# Patient Record
Sex: FEMALE | Race: BLACK OR AFRICAN AMERICAN
[De-identification: names, ages, dates, MRNs, and addresses within clinical notes are randomized per-mention and may not be internally consistent; named-entity substitution may affect disease eponyms.]

---

## 2019-02-20 ENCOUNTER — HOSPITAL ENCOUNTER (INPATIENT)
Dept: HOSPITAL 62 - ER | Age: 54
LOS: 2 days | Discharge: HOME | DRG: 313 | End: 2019-02-22
Attending: FAMILY MEDICINE | Admitting: FAMILY MEDICINE
Payer: OTHER GOVERNMENT

## 2019-02-20 DIAGNOSIS — Z95.5: ICD-10-CM

## 2019-02-20 DIAGNOSIS — Z90.3: ICD-10-CM

## 2019-02-20 DIAGNOSIS — R51: ICD-10-CM

## 2019-02-20 DIAGNOSIS — R07.89: Primary | ICD-10-CM

## 2019-02-20 DIAGNOSIS — Z23: ICD-10-CM

## 2019-02-20 DIAGNOSIS — F17.200: ICD-10-CM

## 2019-02-20 DIAGNOSIS — R10.9: ICD-10-CM

## 2019-02-20 DIAGNOSIS — I25.2: ICD-10-CM

## 2019-02-20 DIAGNOSIS — K58.9: ICD-10-CM

## 2019-02-20 DIAGNOSIS — I25.10: ICD-10-CM

## 2019-02-20 DIAGNOSIS — E66.01: ICD-10-CM

## 2019-02-20 DIAGNOSIS — Z82.49: ICD-10-CM

## 2019-02-20 DIAGNOSIS — E78.5: ICD-10-CM

## 2019-02-20 DIAGNOSIS — Z71.6: ICD-10-CM

## 2019-02-20 DIAGNOSIS — G89.29: ICD-10-CM

## 2019-02-20 DIAGNOSIS — K21.9: ICD-10-CM

## 2019-02-20 DIAGNOSIS — N17.9: ICD-10-CM

## 2019-02-20 DIAGNOSIS — Z79.82: ICD-10-CM

## 2019-02-20 DIAGNOSIS — I10: ICD-10-CM

## 2019-02-20 LAB
ADD MANUAL DIFF: NO
ALBUMIN SERPL-MCNC: 4 G/DL (ref 3.5–5)
ALP SERPL-CCNC: 142 U/L (ref 38–126)
ALT SERPL-CCNC: 32 U/L (ref 9–52)
ANION GAP SERPL CALC-SCNC: 10 MMOL/L (ref 5–19)
AST SERPL-CCNC: 33 U/L (ref 14–36)
BASOPHILS # BLD AUTO: 0 10^3/UL (ref 0–0.2)
BASOPHILS NFR BLD AUTO: 0.4 % (ref 0–2)
BILIRUB DIRECT SERPL-MCNC: 0.3 MG/DL (ref 0–0.4)
BILIRUB SERPL-MCNC: 0.3 MG/DL (ref 0.2–1.3)
BUN SERPL-MCNC: 13 MG/DL (ref 7–20)
CALCIUM: 9.2 MG/DL (ref 8.4–10.2)
CHLORIDE SERPL-SCNC: 103 MMOL/L (ref 98–107)
CK MB SERPL-MCNC: 0.29 NG/ML (ref ?–4.55)
CK MB SERPL-MCNC: 0.36 NG/ML (ref ?–4.55)
CK SERPL-CCNC: 97 U/L (ref 30–135)
CO2 SERPL-SCNC: 28 MMOL/L (ref 22–30)
EOSINOPHIL # BLD AUTO: 0.2 10^3/UL (ref 0–0.6)
EOSINOPHIL NFR BLD AUTO: 2.5 % (ref 0–6)
ERYTHROCYTE [DISTWIDTH] IN BLOOD BY AUTOMATED COUNT: 14.2 % (ref 11.5–14)
GLUCOSE SERPL-MCNC: 90 MG/DL (ref 75–110)
HCT VFR BLD CALC: 39.8 % (ref 36–47)
HGB BLD-MCNC: 13.6 G/DL (ref 12–15.5)
LYMPHOCYTES # BLD AUTO: 3.5 10^3/UL (ref 0.5–4.7)
LYMPHOCYTES NFR BLD AUTO: 48.7 % (ref 13–45)
MCH RBC QN AUTO: 31 PG (ref 27–33.4)
MCHC RBC AUTO-ENTMCNC: 34.3 G/DL (ref 32–36)
MCV RBC AUTO: 91 FL (ref 80–97)
MONOCYTES # BLD AUTO: 0.5 10^3/UL (ref 0.1–1.4)
MONOCYTES NFR BLD AUTO: 7.2 % (ref 3–13)
NEUTROPHILS # BLD AUTO: 3 10^3/UL (ref 1.7–8.2)
NEUTS SEG NFR BLD AUTO: 41.2 % (ref 42–78)
PLATELET # BLD: 329 10^3/UL (ref 150–450)
POTASSIUM SERPL-SCNC: 4.3 MMOL/L (ref 3.6–5)
PROT SERPL-MCNC: 7.2 G/DL (ref 6.3–8.2)
RBC # BLD AUTO: 4.39 10^6/UL (ref 3.72–5.28)
SODIUM SERPL-SCNC: 141.1 MMOL/L (ref 137–145)
TOTAL CELLS COUNTED % (AUTO): 100 %
TROPONIN I SERPL-MCNC: < 0.012 NG/ML
TROPONIN I SERPL-MCNC: < 0.012 NG/ML
WBC # BLD AUTO: 7.3 10^3/UL (ref 4–10.5)

## 2019-02-20 PROCEDURE — 93005 ELECTROCARDIOGRAM TRACING: CPT

## 2019-02-20 PROCEDURE — 84484 ASSAY OF TROPONIN QUANT: CPT

## 2019-02-20 PROCEDURE — 82550 ASSAY OF CK (CPK): CPT

## 2019-02-20 PROCEDURE — 96374 THER/PROPH/DIAG INJ IV PUSH: CPT

## 2019-02-20 PROCEDURE — 71046 X-RAY EXAM CHEST 2 VIEWS: CPT

## 2019-02-20 PROCEDURE — 84481 FREE ASSAY (FT-3): CPT

## 2019-02-20 PROCEDURE — 80053 COMPREHEN METABOLIC PANEL: CPT

## 2019-02-20 PROCEDURE — G0008 ADMIN INFLUENZA VIRUS VAC: HCPCS

## 2019-02-20 PROCEDURE — 36415 COLL VENOUS BLD VENIPUNCTURE: CPT

## 2019-02-20 PROCEDURE — 93010 ELECTROCARDIOGRAM REPORT: CPT

## 2019-02-20 PROCEDURE — 96375 TX/PRO/DX INJ NEW DRUG ADDON: CPT

## 2019-02-20 PROCEDURE — 70450 CT HEAD/BRAIN W/O DYE: CPT

## 2019-02-20 PROCEDURE — 84439 ASSAY OF FREE THYROXINE: CPT

## 2019-02-20 PROCEDURE — 90686 IIV4 VACC NO PRSV 0.5 ML IM: CPT

## 2019-02-20 PROCEDURE — 85027 COMPLETE CBC AUTOMATED: CPT

## 2019-02-20 PROCEDURE — 80307 DRUG TEST PRSMV CHEM ANLYZR: CPT

## 2019-02-20 PROCEDURE — 80061 LIPID PANEL: CPT

## 2019-02-20 PROCEDURE — 99285 EMERGENCY DEPT VISIT HI MDM: CPT

## 2019-02-20 PROCEDURE — 74220 X-RAY XM ESOPHAGUS 1CNTRST: CPT

## 2019-02-20 PROCEDURE — 90471 IMMUNIZATION ADMIN: CPT

## 2019-02-20 PROCEDURE — 80048 BASIC METABOLIC PNL TOTAL CA: CPT

## 2019-02-20 PROCEDURE — 84443 ASSAY THYROID STIM HORMONE: CPT

## 2019-02-20 PROCEDURE — 85025 COMPLETE CBC W/AUTO DIFF WBC: CPT

## 2019-02-20 PROCEDURE — 82553 CREATINE MB FRACTION: CPT

## 2019-02-20 PROCEDURE — 81001 URINALYSIS AUTO W/SCOPE: CPT

## 2019-02-20 PROCEDURE — 83735 ASSAY OF MAGNESIUM: CPT

## 2019-02-20 RX ADMIN — FAMOTIDINE SCH MG: 20 TABLET, FILM COATED ORAL at 22:20

## 2019-02-20 RX ADMIN — Medication SCH: at 22:20

## 2019-02-20 RX ADMIN — MORPHINE SULFATE PRN MG: 10 INJECTION INTRAMUSCULAR; INTRAVENOUS; SUBCUTANEOUS at 22:19

## 2019-02-20 NOTE — ER DOCUMENT REPORT
ED General





- General


Chief Complaint: Chest Pain


Stated Complaint: CHEST PAIN


Time Seen by Provider: 02/20/19 13:43


Primary Care Provider: 


[place] [Primary Care Provider] - Follow up as needed


Mode of Arrival: Ambulatory


Notes: 





53-year-old female with coronary artery disease, AMI status post stent in 2016, 

hypertension, hyperlipidemia, IBS presents with complaint of chest pain and 

headache. Patient denies shortness of breath.  Patient denies vomiting or 

abdominal pain.  Patient denies any numbness or paresthesias in any of her 

extremities.  Patient denies any unilateral weakness.  She states she has felt 

disoriented over the last couple of days.  Patient reports that she has had chr

onic chest pain for years but over the last 3 days it has changed and is now 

described as a pulling instead of a dull ache.  Patient also describes a 

headache with associated nausea and lightheadedness. 


TRAVEL OUTSIDE OF THE [place] IN LAST 30 DAYS: No





- Related Data


Allergies/Adverse Reactions: 


                                        





No Known Allergies Allergy (Verified 02/20/19 12:18)


   











Past Medical History





- General


Information source: Patient, [facility] Records





- Social History


Smoking Status: Current Every Day Smoker


Frequency of alcohol use: Occasional


Drug Abuse: None


Family History: Reviewed & Not Pertinent


Patient has suicidal ideation: No


Patient has homicidal ideation: No





- Past Medical History


Cardiac Medical History: Reports: Hx Heart Attack - 1/2016, Hx 

Hypercholesterolemia, Hx Hypertension


Neurological Medical History: Reports: Hx Migraine


Renal/ Medical History: Denies: Hx Peritoneal Dialysis


Past Surgical History: Reports: Hx Abdominal Surgery - "gorwth" removed, Hx 

Cardiac Surgery - stent 1/2016





Review of Systems





- Review of Systems


Constitutional: See HPI


EENT: No symptoms reported


Cardiovascular: See HPI


Respiratory: See HPI


Gastrointestinal: See HPI


Genitourinary: See HPI


Female Genitourinary: No symptoms reported


Musculoskeletal: No symptoms reported


Skin: No symptoms reported


Hematologic/Lymphatic: No symptoms reported


Neurological/Psychological: See HPI





Physical Exam





- Vital signs


Vitals: 


                                        











Temp Pulse Resp BP Pulse Ox


 


 98.8 F   73   16   113/67   98 


 


 02/20/19 12:34  02/20/19 12:34  02/20/19 12:34  02/20/19 12:34  02/20/19 12:34














- Notes


Notes: 





PHYSICAL EXAMINATION:





Reviewed vital signs and charting by RN





GENERAL: Alert, interacts well. No acute distress.


HEAD: Normocephalic, atraumatic.


EYES: Pupils equal, round, and reactive to light. Extraocular movements intact.


ENT: Oral mucosa moist, tongue midline. 


NECK: Full range of motion. Supple. Trachea midline.


LUNGS: Clear to auscultation bilaterally, no wheezes, rales, or rhonchi. No 

respiratory distress.


HEART: Regular rate and rhythm. No murmur


ABDOMEN: soft, non-tender. Non-distended. Bowel sounds present in all 4 

quadrants. no McBurney's point tenderness, no Rodriguez sign.


EXTREMITIES: Moves all 4 extremities spontaneously. No edema,  No cyanosis.


NEUROLOGICAL: Alert and oriented. Normal speech. 


PSYCH: Normal affect, normal mood.


SKIN: Warm, dry, normal turgor. No rashes or lesions noted.





Course





- Re-evaluation


Re-evalutation: 





02/20/19 18:55


Overall well-appearing 53-year-old female presents with chest pain.  Cardiac 

workup was conducted and was negative.  EKG was sinus rhythm with no STEMI or ST

depressions of abnormalities.  Negative troponin x2.  No abnormal lab work.  

Heart score 4 for moderate suspicion, age, risk factors.  I will call the 

hospitalist on-call, Dr. Weiland, to formally assess the patient for 

observation.


02/20/19 19:17


Talk to Dr. Weiland he accepted the patient for telemetry observation with plan 

for stress test hopefully in the morning.





- Vital Signs


Vital signs: 


                                        











Temp Pulse Resp BP Pulse Ox


 


 98.8 F   73   18   103/63   95 


 


 02/20/19 12:34  02/20/19 12:34  02/20/19 16:01  02/20/19 16:01  02/20/19 16:01














- Laboratory


Result Diagrams: 


                                 02/20/19 15:00





                                 02/20/19 15:00


Laboratory results interpreted by me: 


                                        











  02/20/19 02/20/19





  15:00 15:00


 


RDW  14.2 H 


 


Seg Neutrophils %  41.2 L 


 


Lymphocytes %  48.7 H 


 


Alkaline Phosphatase   142 H














Discharge





- Discharge


Clinical Impression: 


Chest pain


Qualifiers:


 Chest pain type: unspecified Qualified Code(s): R07.9 - Chest pain, unspecified





Headache


Qualifiers:


 Headache type: unspecified Headache chronicity pattern: acute headache 





Condition: Good


Disposition: ADMITTED AS OBSERVATION


Admitting Provider: Hospitalist


Unit Admitted: Telemetry


Referrals: 


[facility] [Primary Care Provider] - Follow up as needed

## 2019-02-20 NOTE — EKG REPORT
SEVERITY:- BORDERLINE ECG -

SINUS RHYTHM

LEFT AXIS DEVIATION

BORDERLINE T ABNORMALITIES, ANTERIOR LEADS

:

Confirmed by: Davian Grant MD 20-Feb-2019 13:04:53

## 2019-02-20 NOTE — ER DOCUMENT REPORT
ED Medical Screen (RME)





- General


Chief Complaint: Chest Pain


Stated Complaint: CHEST PAIN


Time Seen by Provider: 02/20/19 13:43


Mode of Arrival: Ambulatory


Information source: Patient, [facility] Records


Notes: 





53-year-old female with coronary artery disease, hypertension, hyperlipidemia, 

IBS presents with complaint of chest pain and headache.  Patient reports that 

she has had chronic chest pain for years but over the last 3 days it has changed

and is now described as a pulling instead of a dull ache.  Patient also 

describes a headache with associated nausea and lightheadedness.








I have greeted and performed a rapid initial assessment of this patient.  A 

comprehensive ED assessment and evaluation of the patient, analysis of test 

results and completion of medical decision making process we will be contacted 

by additional ED providers.











PHYSICAL EXAMINATION:





Vital signs reviewed-within normal limits





GENERAL: Well-appearing, well-nourished and in no acute distress.





LUNGS: No respiratory distress





Musculoskeletal: Normal range of motion





NEUROLOGICAL:  Normal speech, normal gait. 





PSYCH: Normal mood, normal affect.





SKIN: Warm, Dry, normal turgor, no rashes or lesions noted.


TRAVEL OUTSIDE OF THE [place] IN LAST 30 DAYS: No





- HPI


Onset: Last week


Onset/Duration: Gradual, Persistent, Worse


Quality of pain: Achy, Throbbing, Other - Pulling


Severity: Moderate


Pain Level: 2


Associated Symptoms: Chest pain, Headache


Exacerbated by: Denies


Relieved by: Denies


Similar symptoms previously: Yes


Recently seen / treated by doctor: Yes





- Related Data


Smoking: Non-smoker


Frequency of alcohol use: None


Drug Abuse: None


Allergies/Adverse Reactions: 


                                        





No Known Allergies Allergy (Verified 02/20/19 12:18)


   











Past Medical History





- Social History


Frequency of alcohol use: Occasional


Drug Abuse: None





- Past Medical History


Cardiac Medical History: Reports: Hx Heart Attack - 1/2016, Hx 

Hypercholesterolemia, Hx Hypertension


Neurological Medical History: Reports: Hx Migraine


Renal/ Medical History: Denies: Hx Peritoneal Dialysis


Past Surgical History: Reports: Hx Abdominal Surgery - "gorwth" removed, Hx 

Cardiac Surgery - stent 1/2016





Physical Exam





- Vital signs


Vitals: 





                                        











Temp Pulse Resp BP Pulse Ox


 


 98.8 F   73   16   113/67   98 


 


 02/20/19 12:34  02/20/19 12:34  02/20/19 12:34  02/20/19 12:34  02/20/19 12:34














Course





- Vital Signs


Vital signs: 





                                        











Temp Pulse Resp BP Pulse Ox


 


 98.8 F   73   16   113/67   98 


 


 02/20/19 12:34  02/20/19 12:34  02/20/19 12:34  02/20/19 12:34  02/20/19 12:34

## 2019-02-20 NOTE — RADIOLOGY REPORT (SQ)
EXAM DESCRIPTION:  CHEST 2 VIEWS



COMPLETED DATE/TIME:  2/20/2019 2:32 pm



REASON FOR STUDY:  Chest pain



COMPARISON:  None.



EXAM PARAMETERS:  NUMBER OF VIEWS: two views

TECHNIQUE: Digital Frontal and Lateral radiographic views of the chest acquired.

RADIATION DOSE: NA

LIMITATIONS: none



FINDINGS:  LUNGS AND PLEURA:  Near subsegmental atelectasis or scar in the left mid-lower lung zones.
  The right lung is clear.  No pneumothorax or pleural effusion.

MEDIASTINUM AND HILAR STRUCTURES: No masses or contour abnormalities.

HEART AND VASCULAR STRUCTURES: Heart normal size.  No evidence for failure.

BONES: No acute findings.

HARDWARE: None in the chest.

OTHER: No other significant finding.



IMPRESSION:  1.  Linear subsegmental atelectasis or scar in the left mid-lower lung zones.  No acute 
pulmonary consolidation.



TECHNICAL DOCUMENTATION:  JOB ID:  [id number]

 2011 [employer]- All Rights Reserved



Reading location - IP/workstation name: [email or web address]

## 2019-02-20 NOTE — RADIOLOGY REPORT (SQ)
EXAM DESCRIPTION:  CT HEAD WITHOUT



COMPLETED DATE/TIME:  2/20/2019 2:22 pm



REASON FOR STUDY:  Worsening headache



COMPARISON:  None.



TECHNIQUE:  Axial images acquired through the brain without intravenous contrast.  Images reviewed wi
th bone, brain and subdural windows.  Additional sagittal and coronal reconstructions were generated.
 Images stored on PACS.

All CT scanners at this facility use dose modulation, iterative reconstruction, and/or weight based d
osing when appropriate to reduce radiation dose to as low as reasonably achievable (ALARA).

[facility]: Dose Right  [facility]: CareDose    [facility]: Dose Right    [facility]: Teradose 4D    [facility]: Smart Technologies



RADIATION DOSE:  CT Rad equipment meets quality standard of care and radiation dose reduction techniq
ues were employed. CTDIvol: 53.2 mGy. DLP: 1017 mGy-cm. mGy.



LIMITATIONS:  None.



FINDINGS:  VENTRICLES: Normal size and contour.

CEREBRUM: No masses.  No hemorrhage.  No midline shift.  No evidence for acute infarction. Normal gra
y/white matter differentiation. No areas of low density in the white matter.

CEREBELLUM: No masses.  No hemorrhage.  No alteration of density.  No evidence for acute infarction.

EXTRAAXIAL SPACES: Large falcine calcification.  No fluid collections.  No masses.

ORBITS AND GLOBE: No intra- or extraconal masses.  Normal contour of globe without masses.

CALVARIUM: No fracture.

PARANASAL SINUSES: No fluid or mucosal thickening.

SOFT TISSUES: No mass or hematoma.

OTHER: No other significant finding.



IMPRESSION:  No acute abnormality in brain.

EVIDENCE OF ACUTE STROKE: NO.



COMMENT:  Quality ID # [id number]: Final reports with documentation of one or more dose reduction techniques
 (e.g., Automated exposure control, adjustment of the mA and/or kV according to patient size, use of 
iterative reconstruction technique)



TECHNICAL DOCUMENTATION:  JOB ID:  [id number]

 2011 [employer]- All Rights Reserved



Reading location - IP/workstation name: [email or web address]

## 2019-02-21 LAB
ANION GAP SERPL CALC-SCNC: 8 MMOL/L (ref 5–19)
BUN SERPL-MCNC: 18 MG/DL (ref 7–20)
CALCIUM: 9 MG/DL (ref 8.4–10.2)
CHLORIDE SERPL-SCNC: 105 MMOL/L (ref 98–107)
CHOLEST SERPL-MCNC: 105.25 MG/DL (ref 0–200)
CK MB SERPL-MCNC: 0.26 NG/ML (ref ?–4.55)
CK MB SERPL-MCNC: 0.28 NG/ML (ref ?–4.55)
CK MB SERPL-MCNC: 0.29 NG/ML (ref ?–4.55)
CK MB SERPL-MCNC: 0.3 NG/ML (ref ?–4.55)
CO2 SERPL-SCNC: 27 MMOL/L (ref 22–30)
ERYTHROCYTE [DISTWIDTH] IN BLOOD BY AUTOMATED COUNT: 14.6 % (ref 11.5–14)
FREE T4 (FREE THYROXINE): 1.3 NG/DL (ref 0.78–2.19)
GLUCOSE SERPL-MCNC: 113 MG/DL (ref 75–110)
HCT VFR BLD CALC: 36.1 % (ref 36–47)
HGB BLD-MCNC: 12.4 G/DL (ref 12–15.5)
LDLC SERPL DIRECT ASSAY-MCNC: 72 MG/DL (ref ?–100)
MCH RBC QN AUTO: 31 PG (ref 27–33.4)
MCHC RBC AUTO-ENTMCNC: 34.3 G/DL (ref 32–36)
MCV RBC AUTO: 90 FL (ref 80–97)
PLATELET # BLD: 286 10^3/UL (ref 150–450)
POTASSIUM SERPL-SCNC: 3.8 MMOL/L (ref 3.6–5)
RBC # BLD AUTO: 4 10^6/UL (ref 3.72–5.28)
SODIUM SERPL-SCNC: 139.9 MMOL/L (ref 137–145)
T3FREE SERPL-MCNC: 3.64 PG/ML (ref 2.77–5.27)
TRIGL SERPL-MCNC: 86 MG/DL (ref ?–150)
TROPONIN I SERPL-MCNC: < 0.012 NG/ML
TSH SERPL-ACNC: 4.2 UIU/ML (ref 0.47–4.68)
VLDLC SERPL CALC-MCNC: 17 MG/DL (ref 10–31)
WBC # BLD AUTO: 7.1 10^3/UL (ref 4–10.5)

## 2019-02-21 RX ADMIN — MORPHINE SULFATE PRN MG: 10 INJECTION INTRAMUSCULAR; INTRAVENOUS; SUBCUTANEOUS at 23:52

## 2019-02-21 RX ADMIN — MORPHINE SULFATE PRN MG: 10 INJECTION INTRAMUSCULAR; INTRAVENOUS; SUBCUTANEOUS at 18:27

## 2019-02-21 RX ADMIN — Medication SCH: at 06:30

## 2019-02-21 RX ADMIN — FAMOTIDINE SCH: 20 TABLET, FILM COATED ORAL at 21:49

## 2019-02-21 RX ADMIN — DEXAMETHASONE SODIUM PHOSPHATE PRN MG: 10 INJECTION INTRAMUSCULAR; INTRAVENOUS at 23:52

## 2019-02-21 RX ADMIN — DEXAMETHASONE SODIUM PHOSPHATE PRN MG: 10 INJECTION INTRAMUSCULAR; INTRAVENOUS at 18:28

## 2019-02-21 RX ADMIN — DOCUSATE SODIUM SCH: 100 CAPSULE, LIQUID FILLED ORAL at 17:23

## 2019-02-21 RX ADMIN — ASPIRIN SCH MG: 81 TABLET, COATED ORAL at 09:45

## 2019-02-21 RX ADMIN — MORPHINE SULFATE PRN MG: 10 INJECTION INTRAMUSCULAR; INTRAVENOUS; SUBCUTANEOUS at 09:44

## 2019-02-21 RX ADMIN — FAMOTIDINE SCH MG: 20 TABLET, FILM COATED ORAL at 09:44

## 2019-02-21 RX ADMIN — FONDAPARINUX SODIUM SCH MG: 2.5 INJECTION, SOLUTION SUBCUTANEOUS at 14:48

## 2019-02-21 RX ADMIN — Medication SCH: at 14:48

## 2019-02-21 RX ADMIN — MORPHINE SULFATE PRN MG: 10 INJECTION INTRAMUSCULAR; INTRAVENOUS; SUBCUTANEOUS at 14:48

## 2019-02-21 RX ADMIN — MORPHINE SULFATE PRN MG: 10 INJECTION INTRAMUSCULAR; INTRAVENOUS; SUBCUTANEOUS at 03:45

## 2019-02-21 RX ADMIN — DOCUSATE SODIUM SCH MG: 100 CAPSULE, LIQUID FILLED ORAL at 09:45

## 2019-02-21 RX ADMIN — SERTRALINE HYDROCHLORIDE SCH MG: 50 TABLET ORAL at 09:45

## 2019-02-21 RX ADMIN — Medication SCH ML: at 21:44

## 2019-02-21 RX ADMIN — VITAMIN D, TAB 1000IU (100/BT) SCH UNIT: 25 TAB at 09:48

## 2019-02-21 NOTE — PDOC CONSULTATION
Consultation-Blank


Consultation: 





CARDIOLOGY CONSULTATION by Dr. Joellen Reddy on 2/21/2019.  Patient seen at 1 

PM on 2/21/2019.





REASON FOR CONSULTATION: Patient with history of coronary artery disease, 

history of prior myocardial infarction, and history of stent placement, with 

chest pain.





HISTORY of PRESENT ILLNESS.  Patient is a 53-year-old Afro-American female with 

known history of hypertension, hyperlipidemia, coronary artery disease and 

history of stent placement who states that about 3 days prior to admission had 

sudden onset of a pulling sensation of chest pain in the center of the chest.  

Without radiation.  This was constant, and was unrelieved with any maneuvers.  

She states she did not take nitroglycerin.  She states that this sensation of 

pulling in the chest is different from her GERD symptoms.  She denies any 

increase or the symptoms being precipitated by exertion.  There is no associated

nausea vomiting, or diaphoresis, or palpitations or shortness of breath.  So far

her troponin I have been negative.  The patient denies any PND orthopnea.  There

is no near syncope or syncope.





PAST MEDICAL HISTORY: As per her records from [facility] she had a non-ST 

elevation MI in January 2016.  She had a mid right coronary artery lesion which 

was stented with a drug-eluting stent then.  Subsequently she has been having 

chronic chest pains which are very atypical.  She had an admission last year in 

1 of the hospitals there, and MI was ruled out.  She also has chronic abdominal 

pain.  She states that she had a gastrectomy due to a growth about many years 

ago.  She denies history of diabetes mellitus.  She has a history of 

hypertension.  She also has hyperlipidemia.  There is no history of asthma or 

COPD.  There is no history of sleep apnea.  There is no history of chronic 

kidney disease.  There is no history of TIA CVA symptoms.  The patient denies 

any clinical history of depression or anxiety, but the patient does appear to be

depressed.  On review of her records from [facility], the patient has a history 

of noncardiac chest pain chronically, and also chronic abdominal pain.  She also

has a history of GERD.





PAST SURGICAL HISTORY: Is gastrectomy, cardiac catheterization and stent 

placement in the right coronary artery in 2016 JANUARY AFTER AN MI.  





Social HISTORY: Patient does smoke.  There is no history of EtOH abuse.





FAMILY HISTORY: Is positive for coronary artery disease and hypertension.





ALLERGIES: The patient has no known allergies.





DISPOSITION: The patient's is a full code.  She states her sister and her mother

are her surrogate healthcare decision makers.





REVIEW OF SYSTEMS: CONSTITUTIONAL: Denies any fever chills or rigors.  Complains

of generalized fatigue and malaise.  HEAD: Complains of headaches.  History of 

headaches off and on.  But no definite diagnosis of migraines.  No history of 

head injury.  EYES: No history of amblyopia diplopia.  No history of amaurosis 

fugax.  EARS: No history of hearing loss.  No tinnitus.  No vertigo.  NOSE: No 

history of hayfever.  No history of nosebleeds.  No history of nasal polyposis. 

MOUTH: No history of altered taste sensation.  No ulcers in the mouth.  No 

bleeding from the gums.  THROAT: No history of odynophagia or dysphagia.  No 

recurrent sore throats.  SKIN: No pruritus.  No yellowish discoloration of the 

skin.  No history of psoriasis.  NECK: No denies neck pain.  No swelling in the 

neck.  LUNGS: No history of asthma or COPD.  No wheezing.  No history of 

pulmonary embolism.  Denies history of sleep apnea.  No history of hemoptysis.  

No history of pleuritic chest pain.  No symptoms of bronchitis.  No symptoms of 

upper or lower respiratory tract infections.  HEART: History of coronary artery 

disease.  History of non-ST elevation MI in January 2016.  At that time the 

patient had a drug-eluting stent in the right coronary artery.  She has a 

history of hypertension.  History of chronic atypical noncardiac chest pains.  

History of hypertension present.  History of hyperlipidemia present.  No history

of congestive heart failure.  No history of palpitations or arrhythmia no 

history of PND orthopnea leg edema.  RENAL: No history of chronic kidney 

disease.  No symptoms of UTI.  No history of hematuria pyuria or dysuria.  

ENDOCRINE: No history of thyroid disease.  No history of diabetes mellitus.  No 

history of hirsutism.  No history of heat or cold intolerance.  No history of 

polydipsia polyuria.  MUSCULOSKELETAL: Denies arthritis or collagen vascular 

disease.  CNS: No history of TIA CVA symptoms.  No history of migraines.  

History of headaches present.  No history of seizures.  PSYCHIATRIC: No history 

of anxiety.  The patient does appear to be depressed although she does not carry

a diagnosis of depression.  No suicidal ideation.  No history of homicidal 

ideation.  VASCULAR: No history of calf or buttock claudication no history of 

DVT.  HEMATOLOGICAL: No history of bleeding diathesis.  No history of clotting 

disorders.  GI: History of GERD present.  History of gastrectomy for a growth in

the past.  No history of GI bleed.  No history of jaundice.  No history of fatty

food intolerance.  No history of altered bowel movements.  History of chronic 

abdominal pain off and on.








PHYSICAL EXAMINATION: The patient is morbidly obese.  At present in no acute 

distress in spite of complaints of chest pain.  She appears to be lying 

comfortably in bed.  She is well-groomed.


                                                                Selected Entries











  02/21/19





  12:41


 


Temperature 97.3 F


 


Temperature Oral





Source 


 


Pulse Rate 82


 


Respiratory 16





Rate 


 


Blood Pressure 115/78


 


Blood Pressure 90





Mean 


 


BP Location Left Arm


 


BP Position Supine


 


O2 Sat by Pulse 98





Oximetry 


 


Oxygen Delivery Room Air





Method 








HEAD: Is atraumatic normocephalic.  EYES: Pupils are equal round regular react 

to light and accommodation, extraocular movements are normal.  There is no 

conjunctival pallor.  There is no scleral icterus.  EARS: Tympanic membranes are

intact.  External auditory canals are clear.  NOSE: There is no inflammation of 

the nasal mucous membranes.  There is no deviated nasal septum.  MOUTH: Mucous 

membranes of mouth are moist.  Tongue is moist.  There is no ulcers.  There is 

no bleeding from the gums.  The patient has a class IV Mallampati score.  

THROAT: There is no redness of the oropharynx.  There is no exudates.  SKIN: 

There is no skin rashes or skin lesions.  There is no petechia or ecchymosis.  

NECK: Is  supple, there is no JVD.  Carotids are equal.  There is no carotid 

bruits.  There is no lymphadenopathy.  There is no goiter.  There is no 

accessory muscle respiration use.  Trachea central.  LUNGS: Clear to 

auscultation percussion without any rhonchi rales or wheezing.  On palpation 

there is no chest wall tenderness.  HEART: S1-S2 is heard.  There is no S3 

gallop.  There is no S4 gallop.  There is systolic murmur left sternal border 

and the apex.  There is no rub.  ABDOMEN: Is obese.  Nontender.  There is no 

hepatosplenic megaly.  Bowel sounds are well heard.  There is no tender areas 

masses.  EXTREMITIES: Femorals are deep.  Femorals are diminished.  Leg pulses 

are well felt.  There is no pedal edema.  There is no DVT or cellulitis.  There 

is no calf tenderness.  There is no petechia or ecchymosis.  There is no DVT or 

cellulitis.  There is no cyanosis or clubbing.  Capillary refill is normal.  

CNS: The patient is conscious awake alert oriented x3 with no focal deficits.  

PSYCHIATRIC although the patient judgment and insight are intact, she is of slow

mentation, and her affect is slightly flat.  She does appear to be slightly 

depressed.





Current Medications











Generic Name Dose Route Start Last Admin





  Trade Name Freq  PRN Reason Stop Dose Admin


 


Acetaminophen  650 mg  02/20/19 19:59  02/21/19 21:42





  Tylenol 325 Mg Tablet  PO  03/22/19 19:58  650 mg





  Q4HP PRN   Administration





  For headache, pain or fever   





     





     





     


 


Al Hydrox/Mg Hydrox/Simethicone  30 ml  02/20/19 19:48  02/21/19 03:45





  Maalox Plus Susp 30 Udcup  PO  03/22/19 19:47  30 ml





  Q6HP PRN   Administration





  HEARTBURN   





     





     





     


 


Albuterol  2.5 mg  02/20/19 19:59  





  Ventolin 0.083% Neb 2.5 Mg/3 Ml Ampul  NEB  03/22/19 19:58  





  RTQ1HP PRN   





  SHORTNESS OF BREATH   





     





     





     


 


Amitriptyline HCl  100 mg  02/21/19 22:00  02/21/19 21:42





  Elavil 50 Mg Tablet  PO  03/23/19 21:59  100 mg





  [facility]HS CLARE   Administration





     





     





     





     


 


Amlodipine Besylate  2.5 mg  02/21/19 20:00  02/21/19 21:41





  Norvasc 2.5 Mg Tablet  PO  03/23/19 19:59  2.5 mg





  DAILY CLARE   Administration





     





     





     





     


 


Aspirin  81 mg  02/21/19 10:00  02/21/19 09:45





  Ecotrin 81 Mg Ec Tablet  PO  03/23/19 09:59  81 mg





  DAILY CLARE   Administration





     





     





     





     


 


Atorvastatin Calcium  80 mg  02/21/19 22:00  02/21/19 21:42





  Lipitor 80 Mg Tablet  PO  03/23/19 21:59  80 mg





  QHS CLARE   Administration





     





     





     





     


 


Cholecalciferol  1,000 unit  02/21/19 10:00  02/21/19 09:48





  Vitamin D3 1000 Unit Tablet  PO  03/23/19 09:59  1,000 unit





  DAILY CLARE   Administration





     





     





     





     


 


Docusate Sodium  100 mg  02/21/19 10:00  02/21/19 17:23





  Colace 100 Mg Capsule  PO  03/23/19 09:59  Not Given





  BID [facility]   





     





     





     





     


 


Famotidine  20 mg  02/20/19 22:00  02/21/19 21:49





  Pepcid 20 Mg Tablet  PO  03/22/19 21:59  Not Given





  Q12 CLARE   





     





     





     





     


 


Fondaparinux  2.5 mg  02/21/19 08:00  02/21/19 14:48





  Arixtra Inj 2.5 Mg/0.5 Ml Disp.Syrin  SUBCUT  03/23/19 07:59  2.5 mg





  QAM CLARE   Administration





     





     





     





     


 


Hydrochlorothiazide  12.5 mg  02/21/19 10:00  02/21/19 09:48





  Hydrodiuril 25 Mg Tablet  PO  03/23/19 09:59  Not Given





  DAILY [facility]   





     





     





     





     


 


Sodium Chloride  1,000 mls @ 50 mls/hr  02/21/19 09:49  





  Nacl 0.9% 1000 Ml Iv Soln  IV  03/23/19 09:48  





  CONTINUOUS PRN   





  THIS MED IS NOT "PRN"   





     





     





     


 


Magnesium Hydroxide  30 ml  02/20/19 19:48  





  Milk Of Magnesia 30 Ml Udcup  PO  03/22/19 19:47  





  HSP PRN   





  FOR CONSTIPATION   





     





     





     


 


Morphine Sulfate  2 mg  02/20/19 19:59  02/21/19 18:27





  Morphine 10 Mg/Ml Inj  IV  02/27/19 19:58  2 mg





  Q2HP PRN   Administration





  FOR PAIN SCALE 1-2   





     





     





     


 


Morphine Sulfate  3 mg  02/20/19 19:59  





  Morphine 10 Mg/Ml Inj  IV  02/27/19 19:58  





  Q2HP PRN   





  FOR PAIN SCALE 3-4   





     





     





     


 


Morphine Sulfate  4 mg  02/20/19 19:59  





  Morphine 10 Mg/Ml Inj  IV  02/27/19 19:58  





  Q2HP PRN   





  PAIN SCALE OF 5   





     





     





     


 


Nicotine  1 each  02/20/19 19:59  





  Nicoderm 21 Mg/24 Hr Transderm Patch  TD  03/22/19 19:58  





  DAILYP PRN   





  WITHDRAWAL SYMPTOMS   





     





     





     


 


Nitroglycerin  1 tab  02/20/19 19:59  





  Nitrostat 0.4 Mg (1/150 Gr) Tabs 25/Bottle  SL  03/22/19 19:58  





  Q5MP PRN   





  FOR CHEST PAIN   





     





     





     


 


Ondansetron HCl  4 mg  02/20/19 19:48  02/21/19 18:28





  Zofran Inj/Pf 4 Mg/2 Ml Sdv  IV  03/22/19 19:47  4 mg





  Q4HP PRN   Administration





  FOR NAUSEA/VOMITING   





     





     





     


 


Ondansetron HCl  4 mg  02/20/19 19:48  





  Zofran Odt 4 Mg Tablet  PO  03/22/19 19:47  





  Q4HP PRN   





  FOR NAUSEA/VOMITING   





     





     





     


 


Sertraline HCl  75 mg  02/21/19 10:00  02/21/19 09:45





  Zoloft 50 Mg Tablet  PO  03/23/19 09:59  75 mg





  DAILY CLARE   Administration





     





     





     





     


 


Sodium Chloride  2.5 ml  02/20/19 22:00  02/21/19 21:44





  Saline Flush 2.5 Ml Monoject Prefil Syrin  IV  03/22/19 21:59  2.5 ml





  Q8 CLARE   Administration





     





     





     





     














Discontinued Medications














Generic Name Dose Route Start Last Admin





  Trade Name Freq  PRN Reason Stop Dose Admin


 


Acetaminophen  975 mg  02/20/19 17:53  02/20/19 17:59





  Tylenol 325 Mg Tablet  PO  02/20/19 17:54  975 mg





  NOW ONE   Administration





     





     





     





     


 


Aspirin  324 mg  02/20/19 13:43  02/20/19 14:12





  Aspirin 81 Mg Chewable Tablet  PO  02/20/19 13:44  324 mg





  NOW ONE   Administration





     





     





     





     


 


Diphenhydramine HCl  12.5 mg  02/20/19 13:44  02/20/19 15:08





  Benadryl Inj 50 Mg/1 Ml Vial  IV  02/20/19 13:45  12.5 mg





  NOW ONE   Administration





     





     





     





     


 


Hydralazine HCl  20 mg  02/20/19 19:59  





  Apresoline Inj/Pf 20 Mg/1 Ml Sdv  IV  03/22/19 19:58  





  Q4HP PRN   





  Give For Sbp > 160 / Dbp > 100   





     





     





     


 


Ibuprofen  400 mg  02/20/19 18:49  02/20/19 19:05





  Motrin 600 Mg Tablet  PO  02/20/19 18:50  400 mg





  NOW ONE   Administration





     





     





     





     


 


Lisinopril  40 mg  02/21/19 10:00  02/21/19 09:48





  Prinivil 10 Mg Tablet  PO  03/23/19 09:59  Not Given





  DAILY CLARE   





     





     





     





     


 


Metoclopramide HCl  10 mg  02/20/19 13:44  02/20/19 15:08





  Reglan Inj/Pf 10 Mg/2 Ml Sdv  IV  02/20/19 13:45  10 mg





  NOW ONE   Administration





     





     





     





     


 


Metoprolol Tartrate  100 mg  02/21/19 10:00  02/21/19 09:48





  Lopressor 100 Mg Tablet  PO  03/23/19 09:59  Not Given





  Q12 CLARE   





     





     





     





     


 


Nitroglycerin   tab  02/21/19 09:47  





  Nitrostat 0.4 Mg (1/150 Gr) Tabs 25/Bottle  SL  03/23/19 09:46  





  Q5MP PRN   





  FOR CHEST PAIN   





     





     





     








HOME MEDICATIONS:








                              Labs- All tests 24 hr











  02/21/19 02/21/19 02/21/19





  03:03 03:03 03:03


 


WBC    7.1


 


RBC    4.00


 


Hgb    12.4


 


Hct    36.1


 


MCV    90


 


MCH    31.0


 


MCHC    34.3


 


RDW    14.6 H


 


Plt Count    286


 


Sodium   


 


Potassium   


 


Chloride   


 


Carbon Dioxide   


 


Anion Gap   


 


BUN   


 


Creatinine   


 


Est GFR ( Amer)   


 


Est GFR (Non-Af Amer)   


 


Glucose   


 


Calcium   


 


Magnesium   


 


Creatine Kinase  78  


 


CK-MB (CK-2)   0.29 


 


Troponin I   < 0.012 


 


Triglycerides   


 


Cholesterol   


 


LDL Cholesterol Direct   


 


VLDL Cholesterol   


 


HDL Cholesterol   


 


TSH   


 


Free T4   


 


Free T3 pg/mL   














  02/21/19 02/21/19 02/21/19





  03:03 03:03 09:23


 


WBC   


 


RBC   


 


Hgb   


 


Hct   


 


MCV   


 


MCH   


 


MCHC   


 


RDW   


 


Plt Count   


 


Sodium  139.9  


 


Potassium  3.8  


 


Chloride  105  


 


Carbon Dioxide  27  


 


Anion Gap  8  


 


BUN  18  


 


Creatinine  1.57 H  


 


Est GFR ( Amer)  42 L  


 


Est GFR (Non-Af Amer)  34 L  


 


Glucose  113 H  


 


Calcium  9.0  


 


Magnesium  2.5 H  


 


Creatine Kinase    77


 


CK-MB (CK-2)   


 


Troponin I   


 


Triglycerides  86  


 


Cholesterol  105.25  


 


LDL Cholesterol Direct  72  


 


VLDL Cholesterol  17.0  


 


HDL Cholesterol  29 L  


 


TSH   4.20 


 


Free T4   1.30 


 


Free T3 pg/mL   3.64 














  02/21/19 02/21/19 02/21/19





  09:23 10:03 15:24


 


WBC   


 


RBC   


 


Hgb   


 


Hct   


 


MCV   


 


MCH   


 


MCHC   


 


RDW   


 


Plt Count   


 


Sodium   


 


Potassium   


 


Chloride   


 


Carbon Dioxide   


 


Anion Gap   


 


BUN   


 


Creatinine   


 


Est GFR ( Amer)   


 


Est GFR (Non-Af Amer)   


 


Glucose   


 


Calcium   


 


Magnesium   


 


Creatine Kinase    72


 


CK-MB (CK-2)  0.26  0.30 


 


Troponin I  < 0.012  < 0.012 


 


Triglycerides   


 


Cholesterol   


 


LDL Cholesterol Direct   


 


VLDL Cholesterol   


 


HDL Cholesterol   


 


TSH   


 


Free T4   


 


Free T3 pg/mL   














  02/21/19





  15:24


 


WBC 


 


RBC 


 


Hgb 


 


Hct 


 


MCV 


 


MCH 


 


MCHC 


 


RDW 


 


Plt Count 


 


Sodium 


 


Potassium 


 


Chloride 


 


Carbon Dioxide 


 


Anion Gap 


 


BUN 


 


Creatinine 


 


Est GFR ( Amer) 


 


Est GFR (Non-Af Amer) 


 


Glucose 


 


Calcium 


 


Magnesium 


 


Creatine Kinase 


 


CK-MB (CK-2)  0.28


 


Troponin I  < 0.012


 


Triglycerides 


 


Cholesterol 


 


LDL Cholesterol Direct 


 


VLDL Cholesterol 


 


HDL Cholesterol 


 


TSH 


 


Free T4 


 


Free T3 pg/mL 











                                        





Chest X-Ray  02/20/19 13:43


IMPRESSION:  1.  Linear subsegmental atelectasis or scar in the left mid-lower 

lung zones.  No acute pulmonary consolidation.


 








Head CT  02/20/19 13:46


IMPRESSION:  No acute abnormality in brain.


EVIDENCE OF ACUTE STROKE: NO.


 








Esophagus X-Ray  02/21/19 00:00


IMPRESSION:  Small hiatal hernia.  Minimal gastroesophageal reflux.  Tertiary 

contractions of the esophagus.


 








Acetaminophen [Tylenol Extra Strength 500 mg Tablet] 1,000 mg PO Q6HP PRN 

02/20/19 


Amitriptyline HCl [Elavil 50 mg Tablet] 100 mg PO QHS 02/20/19 


Aspirin [Ecotrin 81 mg EC Tablet] 81 mg PO DAILY 02/20/19 


Atorvastatin Calcium [Lipitor 80 mg Tablet] 80 mg PO QHS 02/20/19 


Cholecalciferol (Vitamin D3) [Vitamin D3 1000 Unit Tablet] 1,000 unit PO DAILY 

02/20/19 


Hydrochlorothiazide [Hydrodiuril 25 mg Tablet] 12.5 mg PO DAILY 02/20/19 


Lisinopril [Prinivil 40 mg Tablet] 40 mg PO DAILY 02/20/19 


Metoprolol Tartrate [Lopressor 100 mg Tablet] 100 mg PO Q12 02/20/19 


Nitroglycerin [Nitrostat 0.4 mg (1/150 Gr) Tabs 25/Bottle] 0.4 mg SL Q5MP PRN 

02/20/19 


Ranitidine HCl [Zantac 150 mg Tablet] 150 mg PO BID 02/20/19 


Sertraline HCl [Zoloft 50 mg Tablet] 75 mg PO DAILY 02/20/19 





EKG: Sinus rhythm nonspecific T changes inferior leads.





IMPRESSION/RECOMMENDATION:





1 chest pain most likely noncardiac.  This may be related to tertiary 

contractions of the esophagus.  MI has been ruled out.  Would recommend starting

the patient on amlodipine initially at 2.5 mg x1 dose, and subsequently 

increased to 5 mg p.o. twice daily.  Continue beta-blocker for now.  Continue 

aspirin.


2.  Coronary artery disease.  History of non-ST elevation MI.  History of drug-

eluting stent to the right coronary artery in 2016 January.  Will add 

amlodipine.  And once this is been started, the patient is chest pain-free, then

would recommend discharge the patient and schedule the patient for outpatient IV

Lexiscan Cardiolite, and an echocardiogram.


3.  Abdominal pain:?  Etiology


4.  Hypertension: Blood pressure seems to be well-controlled continue current 

blood pressure medications.


5.  Hyperlipidemia: Continue statin


6.  Tobacco abuse disorder: Tobacco cessation counseling given.  Patient spent 3

minutes on this.  Ill effects of tobacco have been discussed with the patient.


7.  History of gastrectomy: Patient states it secondary to a growth.  Details 

not available.  Patient may benefit from outpatient gastroenterology 

consultation.


8.  GERD: Continue proton pump inhibitors note patient also has a small hiatal 

hernia.





Medications reviewed.  Medications adjusted.  New medications added.  X-ray 

findings EKG and lab findings were discussed with the patient.  Management plan 

was discussed with attending physician on the case.  Medical decision making is 

of high complexity.  60 minutes spent on this patient more than 50% of time 

spent in direct patient care.  We will follow with you.  The patient has no 

cardiologist here, and she desires to follow-up with me.

## 2019-02-21 NOTE — RADIOLOGY REPORT (SQ)
EXAM DESCRIPTION:  BARIUM SWALLOW ESOPHAGUS



COMPLETED DATE/TIME:  2/21/2019 1:27 pm



REASON FOR STUDY:  CHEST PAIN / GERD



COMPARISON:  None.



TECHNIQUE:  Under fluoroscopic guidance, patient ingested effervescent granules followed by thick and
 thin barium. Fluoroscopic spot images and routine radiographic images acquired and stored on PACS.

12 MM BARIUM TABLET GIVEN: Yes.

No significant delay in passage.



LIMITATIONS:  None.



FLUOROSCOPY TIME:  FLUORO TIME: 1 minutes 43 seconds

14 series of digital images saved to PACS.



FINDINGS:  NEUROMUSCULAR COORDINATION OF SWALLOW: Normal. No aspiration.

ESOPHAGEAL MOTILITY: Normal peristalsis. No esophageal spasm. Occasional tertiary contractions of the
 esophagus.

ESOPHAGEAL MUCOSA: Normal mucosa without masses or ulceration.

GASTRO-ESOPHAGEAL JUNCTION: Small hiatal hernia with minimal reflux.

NON-GI TRACT STRUCTURES: No significant finding.

OTHER: No other significant finding.



IMPRESSION:  Small hiatal hernia.  Minimal gastroesophageal reflux.  Tertiary contractions of the eso
phagus.



COMMENT:  Quality :  Final reports for procedures using fluoroscopy that document radiation exp
osure indices, or exposure time and number of fluorographic images (if radiation exposure indices are
 not available)



TECHNICAL DOCUMENTATION:  JOB ID:  [id number]

 2011 [employer]- All Rights Reserved



Reading location - IP/workstation name: [email or web address]

## 2019-02-21 NOTE — PDOC PROGRESS REPORT
Subjective


Progress Note for:: 02/21/19


Subjective:: 





53 year old female who presented to the emergency room with a 3-day history of a

change in her chronic chest pain.  Patient states that 3 days prior to her 

admission she developed a sudden onset of a "chest pain just like I had when I 

had a heart attack 3 years ago".  The pain is described as a constant, 

continuous, unwavering, moderately severe deep seated, pulling or tearing 

sensation, occurring in the left upper chest without radiation.  She also notes 

that for the last 2 days the chest pain has been accompanied by a right 

frontotemporal headache which she describes as a constant, moderately severe, 

sharp pain also without radiation.  She also notes that she had a similar 

headache only one time before and that was when she had her heart attack.  She 

she admits that her headache and chest pain were accompanied by nausea, mild 

lightheadedness and mild confusion.  She has been unable to identify any 

aggravating or ameliorating factors for her chest pain or headache.  In the 

emergency room she was found to have negative cardiac enzymes and an EKG that 

showed no acute changes suggestive of myocardial ischemia or infarction.  

Because of her cardiac history she was admitted for further evaluation and 

treatment to include a cardiology consultation with Dr. Quintero.








2/[id number]-mmnn-old female with coronary artery disease status post stent 

placement in [facility] in 2016 came in with chest pains.  Still complaining of 

chest pains EKG was sinus rhythm and troponins are negative so far.  Cardiology 

consult was requested.  No acute events in the last 24 hours.  Patient is 

afebrile.  Blood pressure is 99/52 be going to hold her morning medications this

morning.  And to start on IV fluids normal saline at 50 cc/h.


Reason For Visit: 


CHEST PAIN








Physical Exam


Vital Signs: 


                                        











Temp Pulse Resp BP Pulse Ox


 


 97.4 F   71   17   101/52 L  96 


 


 02/21/19 03:00  02/21/19 07:00  02/21/19 03:00  02/21/19 03:00  02/21/19 03:00








                                 Intake & Output











 02/20/19 02/21/19 02/22/19





 06:59 06:59 06:59


 


Weight  94.2 kg 











General appearance: PRESENT: no acute distress


Head exam: PRESENT: atraumatic


Eye exam: PRESENT: PERRLA


Mouth exam: PRESENT: moist, tongue midline


Neck exam: ABSENT: carotid bruit, JVD, lymphadenopathy, thyromegaly


Respiratory exam: PRESENT: clear to auscultation jorge luis.  ABSENT: rales, rhonchi, 

wheezes


Cardiovascular exam: PRESENT: RRR.  ABSENT: diastolic murmur, rubs, systolic 

murmur


GI/Abdominal exam: PRESENT: normal bowel sounds, soft.  ABSENT: distended, 

guarding, mass, organolmegaly, rebound, tenderness


Neurological exam: PRESENT: alert, awake, oriented to person, oriented to place,

oriented to time, oriented to situation, CN II-XII grossly intact.  ABSENT: 

motor sensory deficit


Psychiatric exam: PRESENT: appropriate affect, normal mood.  ABSENT: homicidal 

ideation, suicidal ideation





Results


Laboratory Results: 


                                        





                                 02/21/19 03:03 





                                 02/21/19 03:03 





                                        











  02/20/19 02/20/19 02/21/19





  15:00 15:00 03:03


 


WBC  7.3   7.1


 


RBC  4.39   4.00


 


Hgb  13.6   12.4


 


Hct  39.8   36.1


 


MCV  91   90


 


MCH  31.0   31.0


 


MCHC  34.3   34.3


 


RDW  14.2 H   14.6 H


 


Plt Count  329   286


 


Seg Neutrophils %  41.2 L  


 


Lymphocytes %  48.7 H  


 


Monocytes %  7.2  


 


Eosinophils %  2.5  


 


Basophils %  0.4  


 


Absolute Neutrophils  3.0  


 


Absolute Lymphocytes  3.5  


 


Absolute Monocytes  0.5  


 


Absolute Eosinophils  0.2  


 


Absolute Basophils  0.0  


 


Sodium   141.1 


 


Potassium   4.3 


 


Chloride   103 


 


Carbon Dioxide   28 


 


Anion Gap   10 


 


BUN   13 


 


Creatinine   0.97 


 


Est GFR ( Amer)   > 60 


 


Est GFR (Non-Af Amer)   > 60 


 


Glucose   90 


 


Calcium   9.2 


 


Magnesium   


 


Total Bilirubin   0.3 


 


AST   33 


 


ALT   32 


 


Alkaline Phosphatase   142 H 


 


Total Protein   7.2 


 


Albumin   4.0 


 


Triglycerides   


 


Cholesterol   


 


LDL Cholesterol Direct   


 


VLDL Cholesterol   


 


HDL Cholesterol   


 


TSH   


 


Free T4   


 


Free T3 pg/mL   














  02/21/19 02/21/19





  03:03 03:03


 


WBC  


 


RBC  


 


Hgb  


 


Hct  


 


MCV  


 


MCH  


 


MCHC  


 


RDW  


 


Plt Count  


 


Seg Neutrophils %  


 


Lymphocytes %  


 


Monocytes %  


 


Eosinophils %  


 


Basophils %  


 


Absolute Neutrophils  


 


Absolute Lymphocytes  


 


Absolute Monocytes  


 


Absolute Eosinophils  


 


Absolute Basophils  


 


Sodium  139.9 


 


Potassium  3.8 


 


Chloride  105 


 


Carbon Dioxide  27 


 


Anion Gap  8 


 


BUN  18 


 


Creatinine  1.57 H 


 


Est GFR ( Amer)  42 L 


 


Est GFR (Non-Af Amer)  34 L 


 


Glucose  113 H 


 


Calcium  9.0 


 


Magnesium  2.5 H 


 


Total Bilirubin  


 


AST  


 


ALT  


 


Alkaline Phosphatase  


 


Total Protein  


 


Albumin  


 


Triglycerides  86 


 


Cholesterol  105.25 


 


LDL Cholesterol Direct  72 


 


VLDL Cholesterol  17.0 


 


HDL Cholesterol  29 L 


 


TSH   4.20


 


Free T4   1.30


 


Free T3 pg/mL   3.64








                                        











  02/20/19 02/20/19 02/20/19





  15:00 15:00 18:03


 


Creatine Kinase  97  


 


CK-MB (CK-2)   0.36 


 


Troponin I   < 0.012  < 0.012














  02/20/19 02/20/19 02/21/19





  21:00 21:00 03:03


 


Creatine Kinase  82   78


 


CK-MB (CK-2)   0.29 


 


Troponin I   < 0.012 














  02/21/19





  03:03


 


Creatine Kinase 


 


CK-MB (CK-2)  0.29


 


Troponin I  < 0.012











Impressions: 


                                        





Chest X-Ray  02/20/19 13:43


IMPRESSION:  1.  Linear subsegmental atelectasis or scar in the left mid-lower 

lung zones.  No acute pulmonary consolidation.


 








Head CT  02/20/19 13:46


IMPRESSION:  No acute abnormality in brain.


EVIDENCE OF ACUTE STROKE: NO.


 














Assessment & Plan





- Diagnosis


(1) CAD (coronary artery disease), native coronary artery


Qualifiers: 


   Native vs. transplanted heart: native heart   Associated angina: angina 

presence unspecified   Qualified Code(s): I25.10 - Atherosclerotic heart disease

of native coronary artery without angina pectoris   


Is this a current diagnosis for this admission?: Yes   


Plan: 


Patient will be evaluated utilizing serial cardiac enzymes and EKG evaluations 

as well as obtaining a cardiac consultation with Dr. Quintero.  She will be 

continued on her current cardiac medications and her acute chest pain will be 

treated with morphine 2-4 mg IV every 2 hours as needed for chest pain.








2/21/2019-patient came in with chest pain she had a history of coronary artery 

disease status post stent placement in 2016.  Cardiac enzymes are negative EKG 

was negative cardiology consultation was done with Dr. Quintero.  Recent is on

IV morphine every 2 hours as needed for chest pains.  Plan is to continue the 

present management.








(2) HTN (hypertension)


Qualifiers: 


   Hypertension type: essential hypertension   Qualified Code(s): I10 - 

Essential (primary) hypertension   


Is this a current diagnosis for this admission?: Yes   


Plan: 


Patient blood pressure just now is 99/52.  We are going to check a manual blood 

pressure.  Plan to hold her blood pressure medications.  Creatinine is also 

jumped from 0.97-1.57 started on IV fluids normal saline 50 cc/h.








(3) HLD (hyperlipidemia)


Qualifiers: 


   Hyperlipidemia type: unspecified   Qualified Code(s): E78.5 - Hyperlipidemia,

unspecified   


Is this a current diagnosis for this admission?: Yes   


Plan: 


2/21/2019-patient has history of hyperlipidemia, presently on atorvastatin 80 mg

p.o. nightly.  Patient's total cholesterol is 105 and LDL is 72.








(4) Obesity (BMI 30-39.9)


Is this a current diagnosis for this admission?: Yes   


Plan: 


2/21/2019-diet exercise weight loss and lifestyle modifications are discussed 

with the patient.








(5) Acute kidney failure


Is this a current diagnosis for this admission?: Yes   


Plan: 


2/21/2019-patient's admission creatinine is 0.97 and today it is 1.57 acute 

kidney injury most likely prerenal.  Started on normal saline at 50 cc/h plan to

recheck her labs tomorrow.








- Time


Time Spent with patient: 15-24 minutes


Smoking Cessation Education: over 10 minutes


Medications reviewed and adjusted accordingly: Yes


Anticipated discharge: Home

## 2019-02-21 NOTE — EKG REPORT
SEVERITY:- ABNORMAL ECG -

SINUS RHYTHM

LAD, CONSIDER LEFT ANTERIOR FASCICULAR BLOCK

:

Confirmed by: Davian Grant MD 21-Feb-2019 07:41:15

## 2019-02-21 NOTE — PDOC H&P
History of Present Illness


Admission Date/PCP: 


  





  [facility]





Patient complains of: Chest pain


History of Present Illness: 


ALONDRA LAMB is a 53 year old female who presented to the emergency room with a 3-

day history of a change in her chronic chest pain.  Patient states that 3 days 

prior to her admission she developed a sudden onset of a "chest pain just like I

had when I had a heart attack 3 years ago".  The pain is described as a 

constant, continuous, unwavering, moderately severe deep seated, pulling or 

tearing sensation, occurring in the left upper chest without radiation.  She 

also notes that for the last 2 days the chest pain has been accompanied by a rig

ht frontotemporal headache which she describes as a constant, moderately severe,

sharp pain also without radiation.  She also notes that she had a similar 

headache only one time before and that was when she had her heart attack.  She 

she admits that her headache and chest pain were accompanied by nausea, mild 

lightheadedness and mild confusion.  She has been unable to identify any 

aggravating or ameliorating factors for her chest pain or headache.  In the 

emergency room she was found to have negative cardiac enzymes and an EKG that 

showed no acute changes suggestive of myocardial ischemia or infarction.  

Because of her cardiac history she was admitted for further evaluation and 

treatment to include a cardiology consultation with Dr. Quintero.





Past Medical History


Cardiac Medical History: Reports: Coronary Artery Disease, Myocardial Infarction

- 1/2016, Hyperlipidema, Hypertension, Other - Chronic chest pain


   Denies: Atrial Fibrillation, Congestive Heart Failure, DVT, Pulmonary 

Embolism


Cardiac History Note: 


Chronic chest pain: A constant, continuous, unchanging, generalized, mildly to 

moderately intense, dull aching pain of the central and left anterior chest 

without radiation.


Pulmonary Medical History: 


   Denies: Asthma, Chronic Obstructive Pulmonary Disease (COPD)


EENT Medical History: Reports: None


Neurological Medical History: 


   Denies: Hemorrhagic CVA, Ischemic CVA, Migraine, Seizures


Endocrine Medical History: Reports: Obesity


   Denies: Diabetes Mellitus Type 1, Diabetes Mellitus Type 2, Hyperthyroidism, 

Hypothyroidism


Renal/ Medical History: 


   Denies: Chronic Kidney Disease, Nephrolithiasis


Malignancy Medical History: Reports: None


GI Medical History: Reports: Other - Irritable bowel syndrome


   Denies: Cirrhosis, Hepatitis


Musculoskeltal Medical History: 


   Denies: Arthritis, Gout


Skin Medical History: 


   Denies: Eczema, Psoriasis


Psychiatric Medical History: Reports: Tobacco Dependency


   Denies: Alcohol Dependency, Substance Abuse


Traumatic Medical History: Reports: None


Hematology: 


   Denies: Anemia, Bleeding Tendencies


Infectious Medical History: Reports: None





Past Surgical History


Past Surgical History: Reports: Cardiac Catheterization - 1/2016, Coronary Stent

- 1/2016





Social History


Information Source: Patient


Lives with: Alone


Smoking Status: Current Every Day Smoker


Frequency of Alcohol Use: Rare


Hx Recreational Drug Use: No


Drugs: None


Hx Prescription Drug Abuse: No





- Advance Directive


Resuscitation Status: Full Code


Surrogate healthcare decision maker:: 


Mother





Family History


Family History: CAD, Hypertension


Parental Family History Reviewed: Yes


Children Family History Reviewed: No


Sibling(s) Family History Reviewed.: Yes





Medication/Allergy


Home Medications: 








Acetaminophen [Tylenol Extra Strength 500 mg Tablet] 1,000 mg PO Q6HP PRN 

02/20/19 


Amitriptyline HCl [Elavil 50 mg Tablet] 100 mg PO QHS 02/20/19 


Aspirin [Ecotrin 81 mg EC Tablet] 81 mg PO DAILY 02/20/19 


Atorvastatin Calcium [Lipitor 80 mg Tablet] 80 mg PO QHS 02/20/19 


Cholecalciferol (Vitamin D3) [Vitamin D3 1000 Unit Tablet] 1,000 unit PO DAILY 

02/20/19 


Hydrochlorothiazide [Hydrodiuril 25 mg Tablet] 12.5 mg PO DAILY 02/20/19 


Lisinopril [Prinivil 40 mg Tablet] 40 mg PO DAILY 02/20/19 


Metoprolol Tartrate [Lopressor 100 mg Tablet] 100 mg PO Q12 02/20/19 


Nitroglycerin [Nitrostat 0.4 mg (1/150 Gr) Tabs 25/Bottle] 0.4 mg SL Q5MP PRN 

02/20/19 


Ranitidine HCl [Zantac 150 mg Tablet] 150 mg PO BID 02/20/19 


Sertraline HCl [Zoloft 50 mg Tablet] 75 mg PO DAILY 02/20/19 








Allergies/Adverse Reactions: 


                                        





No Known Allergies Allergy (Verified 02/20/19 12:18)


   











Review of Systems


Constitutional: PRESENT: as per HPI, headache(s).  ABSENT: chills, fever(s), 

weakness


Eyes: ABSENT: visual disturbances, other - Eye pain


Ears: ABSENT: hearing changes, other - Ear pain


Nose, Mouth, and Throat: ABSENT: mouth pain, sore throat


Cardiovascular: PRESENT: as per HPI, chest pain.  ABSENT: dyspnea on exertion, 

edema, orthropnea, palpitations


Respiratory: ABSENT: cough, dyspnea


Gastrointestinal: PRESENT: as per HPI, nausea.  ABSENT: abdominal pain, 

constipation, diarrhea, vomiting


Genitourinary: ABSENT: dysuria, hematuria


Musculoskeletal: ABSENT: deformity, joint swelling


Integumentary: ABSENT: pruritus, rash


Neurological: PRESENT: as per HPI, confusion - Mild disorientation, other - 

Lightheadedness.  ABSENT: convulsions, memory loss


Psychiatric: ABSENT: anxiety, depression


Endocrine: ABSENT: cold intolerance, heat intolerance


Hematologic/Lymphatic: ABSENT: easy bleeding, easy bruising





Physical Exam


Vital Signs: 


                                        











Temp Pulse Resp BP Pulse Ox


 


 98.8 F   73   18   103/63   95 


 


 02/20/19 12:34  02/20/19 12:34  02/20/19 16:01  02/20/19 16:01  02/20/19 16:01








                                 Intake & Output











 02/18/19 02/19/19 02/20/19





 23:59 23:59 23:59


 


Weight   92.7 kg











General appearance: PRESENT: no acute distress, cooperative, morbidly obese


Head exam: PRESENT: atraumatic, normocephalic


Eye exam: PRESENT: conjunctiva pink, EOMI.  ABSENT: scleral icterus


Ear exam: PRESENT: normal external ear exam.  ABSENT: bleeding, drainage


Mouth exam: PRESENT: dry mucosa, neck supple


Neck exam: ABSENT: thyromegaly, tracheal deviation


Respiratory exam: PRESENT: clear to auscultation jorge luis, symmetrical, unlabored


Cardiovascular exam: PRESENT: RRR.  ABSENT: clicks, gallop, rubs


Pulses: PRESENT: normal radial pulses, normal dorsalis pedis pul


Vascular exam: PRESENT: normal capillary refill.  ABSENT: pallor


GI/Abdominal exam: PRESENT: normal bowel sounds, soft


Rectal exam: PRESENT: deferred


Extremities exam: ABSENT: joint swelling, pedal edema


Musculoskeletal exam: ABSENT: deformity, dislocation


Neurological exam: PRESENT: alert, oriented to person, oriented to place, 

oriented to time, oriented to situation, CN II-XII grossly intact.  ABSENT: 

motor sensory deficit


Psychiatric exam: PRESENT: appropriate affect, normal mood


Skin exam: PRESENT: dry, intact, warm.  ABSENT: jaundice, rash, urticaria





Results


Laboratory Results: 


                                        





                                 02/20/19 15:00 





                                 02/20/19 15:00 





                                        











  02/20/19 02/20/19





  15:00 15:00


 


WBC  7.3 


 


RBC  4.39 


 


Hgb  13.6 


 


Hct  39.8 


 


MCV  91 


 


MCH  31.0 


 


MCHC  34.3 


 


RDW  14.2 H 


 


Plt Count  329 


 


Seg Neutrophils %  41.2 L 


 


Lymphocytes %  48.7 H 


 


Monocytes %  7.2 


 


Eosinophils %  2.5 


 


Basophils %  0.4 


 


Absolute Neutrophils  3.0 


 


Absolute Lymphocytes  3.5 


 


Absolute Monocytes  0.5 


 


Absolute Eosinophils  0.2 


 


Absolute Basophils  0.0 


 


Sodium   141.1


 


Potassium   4.3


 


Chloride   103


 


Carbon Dioxide   28


 


Anion Gap   10


 


BUN   13


 


Creatinine   0.97


 


Est GFR ( Amer)   > 60


 


Est GFR (Non-Af Amer)   > 60


 


Glucose   90


 


Calcium   9.2


 


Total Bilirubin   0.3


 


AST   33


 


ALT   32


 


Alkaline Phosphatase   142 H


 


Total Protein   7.2


 


Albumin   4.0








                                        











  02/20/19 02/20/19 02/20/19





  15:00 15:00 18:03


 


Creatine Kinase  97  


 


CK-MB (CK-2)   0.36 


 


Troponin I   < 0.012  < 0.012











Impressions: 


                                        





Chest X-Ray  02/20/19 13:43


IMPRESSION:  1.  Linear subsegmental atelectasis or scar in the left mid-lower 

lung zones.  No acute pulmonary consolidation.


 








Head CT  02/20/19 13:46


IMPRESSION:  No acute abnormality in brain.


EVIDENCE OF ACUTE STROKE: NO.


 














Assessment & Plan





- Diagnosis


(1) CAD (coronary artery disease), native coronary artery


Qualifiers: 


   Native vs. transplanted heart: native heart   Associated angina: angina 

presence unspecified   Qualified Code(s): I25.10 - Atherosclerotic heart disease

of native coronary artery without angina pectoris   


Is this a current diagnosis for this admission?: Yes   


Plan: 


Patient will be evaluated utilizing serial cardiac enzymes and EKG evaluations 

as well as obtaining a cardiac consultation with Dr. Quintero.  She will be 

continued on her current cardiac medications and her acute chest pain will be 

treated with morphine 2-4 mg IV every 2 hours as needed for chest pain.








(2) HTN (hypertension)


Qualifiers: 


   Hypertension type: essential hypertension   Qualified Code(s): I10 - 

Essential (primary) hypertension   


Is this a current diagnosis for this admission?: Yes   


Plan: 


Patient will be continued on her current medications for hypertension and her 

vital signs will be monitored closely.








(3) HLD (hyperlipidemia)


Qualifiers: 


   Hyperlipidemia type: unspecified   Qualified Code(s): E78.5 - Hyperlipidemia,

unspecified   


Is this a current diagnosis for this admission?: Yes   


Plan: 


Patient will be continued on her current statin therapy and a lipid profile will

be obtained to evaluate the efficacy of therapy.








(4) Chronic chest pain


Is this a current diagnosis for this admission?: Yes   


Plan: 


Patient's chronic chest pain will be treated on an ongoing basis as recommended 

by Dr. Quintero.








(5) Obesity (BMI 30-39.9)


Is this a current diagnosis for this admission?: Yes   


Plan: 


Patient will be evaluated by the dietitian and dietary recommendations for 

lifestyle changes to enhance her health and life going forward will be made.








(6) Tobacco use disorder, severe, dependence


Is this a current diagnosis for this admission?: Yes   


Plan: 


Advised patient smoking should be discontinued and counseling for smoking 

cessation was provided briefly.  A nicotine patch will be available for her use.








- Time


Time Spent: 30 to 50 Minutes


Critical Time spent with patient: Less than 15 minutes


Smoking Cessation Education: 3 to 10 minutes


Medications reviewed and adjusted accordingly: Yes


Anticipated discharge: Home


Within: within 48 hours





- Inpatient Certification


Based on my medical assessment, after consideration of the patient's 

comorbidities, presenting symptoms, or acuity I expect that the services needed 

warrant INPATIENT care.: No


I certify that my determination is in accordance with my understanding of 

Medicare's requirements for reasonable and necessary INPATIENT services [42 CFR 

412.3e].: No


Medical Necessity: Significant Comorbidiites Make Outpatient Treatment Too 

Risky, Need Close Monitoring Due to Risk of Patient Decompensation, Need For 

Continuous Telemetry Monitoring, Risk of Complication if Not Cared For in 

Hospital

## 2019-02-22 VITALS — DIASTOLIC BLOOD PRESSURE: 52 MMHG | SYSTOLIC BLOOD PRESSURE: 101 MMHG

## 2019-02-22 LAB
ADD MANUAL DIFF: NO
ALBUMIN SERPL-MCNC: 3.4 G/DL (ref 3.5–5)
ALP SERPL-CCNC: 114 U/L (ref 38–126)
ALT SERPL-CCNC: 26 U/L (ref 9–52)
ANION GAP SERPL CALC-SCNC: 8 MMOL/L (ref 5–19)
APPEARANCE UR: (no result)
APTT PPP: YELLOW S
AST SERPL-CCNC: 21 U/L (ref 14–36)
BARBITURATES UR QL SCN: NEGATIVE
BASOPHILS # BLD AUTO: 0 10^3/UL (ref 0–0.2)
BASOPHILS NFR BLD AUTO: 0.7 % (ref 0–2)
BILIRUB DIRECT SERPL-MCNC: 0.2 MG/DL (ref 0–0.4)
BILIRUB SERPL-MCNC: 0.4 MG/DL (ref 0.2–1.3)
BILIRUB UR QL STRIP: NEGATIVE
BUN SERPL-MCNC: 15 MG/DL (ref 7–20)
CALCIUM: 8.6 MG/DL (ref 8.4–10.2)
CHLORIDE SERPL-SCNC: 104 MMOL/L (ref 98–107)
CK MB SERPL-MCNC: 0.3 NG/ML (ref ?–4.55)
CO2 SERPL-SCNC: 26 MMOL/L (ref 22–30)
EOSINOPHIL # BLD AUTO: 0.1 10^3/UL (ref 0–0.6)
EOSINOPHIL NFR BLD AUTO: 1.5 % (ref 0–6)
ERYTHROCYTE [DISTWIDTH] IN BLOOD BY AUTOMATED COUNT: 14.2 % (ref 11.5–14)
GLUCOSE SERPL-MCNC: 107 MG/DL (ref 75–110)
GLUCOSE UR STRIP-MCNC: NEGATIVE MG/DL
HCT VFR BLD CALC: 36 % (ref 36–47)
HGB BLD-MCNC: 12.2 G/DL (ref 12–15.5)
KETONES UR STRIP-MCNC: NEGATIVE MG/DL
LYMPHOCYTES # BLD AUTO: 3 10^3/UL (ref 0.5–4.7)
LYMPHOCYTES NFR BLD AUTO: 39.7 % (ref 13–45)
MCH RBC QN AUTO: 30.4 PG (ref 27–33.4)
MCHC RBC AUTO-ENTMCNC: 34 G/DL (ref 32–36)
MCV RBC AUTO: 89 FL (ref 80–97)
METHADONE UR QL SCN: NEGATIVE
MONOCYTES # BLD AUTO: 0.5 10^3/UL (ref 0.1–1.4)
MONOCYTES NFR BLD AUTO: 7.1 % (ref 3–13)
NEUTROPHILS # BLD AUTO: 3.8 10^3/UL (ref 1.7–8.2)
NEUTS SEG NFR BLD AUTO: 51 % (ref 42–78)
NITRITE UR QL STRIP: NEGATIVE
PCP UR QL SCN: NEGATIVE
PH UR STRIP: 5 [PH] (ref 5–9)
PLATELET # BLD: 269 10^3/UL (ref 150–450)
POTASSIUM SERPL-SCNC: 3.8 MMOL/L (ref 3.6–5)
PROT SERPL-MCNC: 6.2 G/DL (ref 6.3–8.2)
PROT UR STRIP-MCNC: NEGATIVE MG/DL
RBC # BLD AUTO: 4.03 10^6/UL (ref 3.72–5.28)
SODIUM SERPL-SCNC: 138.3 MMOL/L (ref 137–145)
SP GR UR STRIP: 1.02
TOTAL CELLS COUNTED % (AUTO): 100 %
TROPONIN I SERPL-MCNC: < 0.012 NG/ML
URINE AMPHETAMINES SCREEN: NEGATIVE
URINE BENZODIAZEPINES SCREEN: NEGATIVE
URINE COCAINE SCREEN: NEGATIVE
URINE MARIJUANA (THC) SCREEN: NEGATIVE
UROBILINOGEN UR-MCNC: NEGATIVE MG/DL (ref ?–2)
WBC # BLD AUTO: 7.5 10^3/UL (ref 4–10.5)

## 2019-02-22 RX ADMIN — DEXAMETHASONE SODIUM PHOSPHATE PRN MG: 10 INJECTION INTRAMUSCULAR; INTRAVENOUS at 06:11

## 2019-02-22 RX ADMIN — VITAMIN D, TAB 1000IU (100/BT) SCH UNIT: 25 TAB at 10:16

## 2019-02-22 RX ADMIN — ASPIRIN SCH MG: 81 TABLET, COATED ORAL at 10:18

## 2019-02-22 RX ADMIN — DOCUSATE SODIUM SCH: 100 CAPSULE, LIQUID FILLED ORAL at 10:18

## 2019-02-22 RX ADMIN — FAMOTIDINE SCH MG: 20 TABLET, FILM COATED ORAL at 10:17

## 2019-02-22 RX ADMIN — FONDAPARINUX SODIUM SCH MG: 2.5 INJECTION, SOLUTION SUBCUTANEOUS at 10:18

## 2019-02-22 RX ADMIN — SERTRALINE HYDROCHLORIDE SCH MG: 50 TABLET ORAL at 10:16

## 2019-02-22 RX ADMIN — MORPHINE SULFATE PRN MG: 10 INJECTION INTRAMUSCULAR; INTRAVENOUS; SUBCUTANEOUS at 06:11

## 2019-02-22 RX ADMIN — Medication SCH ML: at 06:11

## 2019-02-22 NOTE — PDOC DISCHARGE SUMMARY
General





- Admit/Disc Date/PCP


Admission Date/Primary Care Provider: 


  02/21/19 09:50





  [facility]





Discharge Date: 02/22/19





- Discharge Diagnosis


(1) CAD (coronary artery disease), native coronary artery


Is this a current diagnosis for this admission?: Yes   


Summary: 


Patient will be evaluated utilizing serial cardiac enzymes and EKG evaluations 

as well as obtaining a cardiac consultation with Dr. Quintero.  She will be 

continued on her current cardiac medications and her acute chest pain will be 

treated with morphine 2-4 mg IV every 2 hours as needed for chest pain.








2/21/2019-patient came in with chest pain she had a history of coronary artery 

disease status post stent placement in 2016.  Cardiac enzymes are negative EKG 

was negative cardiology consultation was done with Dr. Quintero.  Recent is on

IV morphine every 2 hours as needed for chest pains.  Plan is to continue the 

present management.








2/22/2019-patient has history of coronary artery disease status post stent 

placement in 2016.  During this hospital stay cardiac enzymes, EKGs are 

negative.  Cardiology consult with Dr. Quintero was done he is impression is 

pain is noncardiac in origin.  May be most likely musculoskeletal.  He planning 

to see her in the office next week and arrange for outpatient stress test.  He 

recommended to start the patient on amlodipine 2.5 mg p.o. twice daily, and 

continue metoprolol 100 mg p.o. twice daily.  Plan to continue her aspirin and 

cholesterol also.  The patient is pain-free plan is to arrange for the 

outpatient stress test.








(2) HTN (hypertension)


Is this a current diagnosis for this admission?: Yes   


Summary: 


Patient blood pressure just now is 99/52.  We are going to check a manual blood 

pressure.  Plan to hold her blood pressure medications.  Creatinine is also 

jumped from 0.97-1.57 started on IV fluids normal saline 50 cc/h.





2/22/2019-patient blood pressure today he is 115/78.  And creatinine was 

improved 2.96.  Hypotension is resolved.  Patient is advised to continue 

amlodipine 2.5 mg p.o. twice daily and metoprolol 100 mg p.o. every 12 hours at 

home.








(3) HLD (hyperlipidemia)


Is this a current diagnosis for this admission?: Yes   


Summary: 


2/21/2019-patient has history of hyperlipidemia, presently on atorvastatin 80 mg

p.o. nightly.  Patient's total cholesterol is 105 and LDL is 72.





2/22/2019 patient history of hyperlipidemia on atorvastatin 80 mg p.o. nightly 

plan is advised her to continue the medication at home.








(4) Obesity (BMI 30-39.9)


Is this a current diagnosis for this admission?: Yes   


Summary: 


2/22/2019-patient's BMI is more than 35 diet exercise weight loss and lifestyle 

modifications are discussed with the patient.  Dietary consult was done during 

the hospital stay.








(5) Acute kidney failure


Is this a current diagnosis for this admission?: Yes   


Summary: 


2/21/2019-patient's admission creatinine is 0.97 and today it is 1.57 acute 

kidney injury most likely prerenal.  Started on normal saline at 50 cc/h plan to

recheck her labs tomorrow.


2/22/2019-patient creatinine is 1.57 yesterday started on IV fluids at 50 cc/h 

creatinine is back to 2.96 acute kidney injury most likely prerenal is resolved.








- Additional Information


Resuscitation Status: Full Code


Discharge Diet: Cardiac


Discharge Activity: Activity As Tolerated


Prescriptions: 


Amlodipine Besylate [Norvasc 2.5 mg Tablet] 2.5 mg PO Q12 #60 tablet


Nitroglycerin [Nitrostat 0.4 mg (1/150 Gr) Tabs 25/Bottle] 1 tab SL Q5MP PRN #30

bottle


 PRN Reason: 


Home Medications: 








Amitriptyline HCl [Elavil 50 mg Tablet] 100 mg PO QHS 02/20/19 


Aspirin [Ecotrin 81 mg EC Tablet] 81 mg PO DAILY 02/20/19 


Atorvastatin Calcium [Lipitor 80 mg Tablet] 80 mg PO QHS 02/20/19 


Cholecalciferol (Vitamin D3) [Vitamin D3 1000 Unit Tablet] 1,000 unit PO DAILY 

02/20/19 


Hydrochlorothiazide [Hydrodiuril 25 mg Tablet] 12.5 mg PO DAILY 02/20/19 


Lisinopril [Prinivil 40 mg Tablet] 40 mg PO DAILY 02/20/19 


Metoprolol Tartrate [Lopressor 100 mg Tablet] 100 mg PO Q12 02/20/19 


Nitroglycerin [Nitrostat 0.4 mg (1/150 Gr) Tabs 25/Bottle] 0.4 mg SL Q5MP PRN 

02/20/19 


Ranitidine HCl [Zantac 150 mg Tablet] 150 mg PO BID 02/20/19 


Sertraline HCl [Zoloft 50 mg Tablet] 75 mg PO DAILY 02/20/19 


Amlodipine Besylate [Norvasc 2.5 mg Tablet] 2.5 mg PO Q12 #60 tablet 02/22/19 


Nitroglycerin [Nitrostat 0.4 mg (1/150 Gr) Tabs 25/Bottle] 1 tab SL Q5MP PRN #30

bottle 02/22/19 











History of Present Illness


History of Present Illness: 


ALONDRA LAMB is a 53 year old female


53 year old female who presented to the emergency room with a 3-day history of a

change in her chronic chest pain.  Patient states that 3 days prior to her 

admission she developed a sudden onset of a "chest pain just like I had when I 

had a heart attack 3 years ago".  The pain is described as a constant, 

continuous, unwavering, moderately severe deep seated, pulling or tearing se

nsation, occurring in the left upper chest without radiation.  She also notes 

that for the last 2 days the chest pain has been accompanied by a right 

frontotemporal headache which she describes as a constant, moderately severe, 

sharp pain also without radiation.  She also notes that she had a similar 

headache only one time before and that was when she had her heart attack.  She 

she admits that her headache and chest pain were accompanied by nausea, mild 

lightheadedness and mild confusion.  She has been unable to identify any 

aggravating or ameliorating factors for her chest pain or headache.  In the 

emergency room she was found to have negative cardiac enzymes and an EKG that 

showed no acute changes suggestive of myocardial ischemia or infarction.  

Because of her cardiac history she was admitted for further evaluation and 

treatment to include a cardiology consultation with Dr. Quintero.





Physical Exam


Vital Signs: 


                                        











Temp Pulse Resp BP Pulse Ox


 


 97.3 F   106 H  16   101/52 L  98 


 


 02/22/19 11:09  02/22/19 11:09  02/22/19 11:09  02/22/19 11:09  02/22/19 11:09








                                 Intake & Output











 02/21/19 02/22/19 02/23/19





 06:59 06:59 06:59


 


Intake Total  469 


 


Output Total  750 


 


Balance  -281 


 


Weight 94.2 kg 94.2 kg 











General appearance: PRESENT: no acute distress


Head exam: PRESENT: atraumatic


Eye exam: PRESENT: PERRLA


Mouth exam: PRESENT: moist, tongue midline


Neck exam: ABSENT: carotid bruit, JVD, lymphadenopathy, thyromegaly


Respiratory exam: PRESENT: clear to auscultation jorge luis.  ABSENT: rales, rhonchi, 

wheezes


Cardiovascular exam: PRESENT: RRR.  ABSENT: diastolic murmur, rubs, systolic 

murmur


GI/Abdominal exam: PRESENT: normal bowel sounds, soft.  ABSENT: distended, 

guarding, mass, organolmegaly, rebound, tenderness


Neurological exam: PRESENT: alert, awake, oriented to person, oriented to place,

oriented to time, oriented to situation, CN II-XII grossly intact.  ABSENT: 

motor sensory deficit


Psychiatric exam: PRESENT: appropriate affect, normal mood.  ABSENT: homicidal 

ideation, suicidal ideation





Results


Laboratory Results: 


                                        





                                 02/22/19 03:25 





                                 02/22/19 03:25 





                                        











  02/21/19 02/22/19 02/22/19





  23:57 03:25 03:25


 


WBC   7.5 


 


RBC   4.03 


 


Hgb   12.2 


 


Hct   36.0 


 


MCV   89 


 


MCH   30.4 


 


MCHC   34.0 


 


RDW   14.2 H 


 


Plt Count   269 


 


Seg Neutrophils %   51.0 


 


Lymphocytes %   39.7 


 


Monocytes %   7.1 


 


Eosinophils %   1.5 


 


Basophils %   0.7 


 


Absolute Neutrophils   3.8 


 


Absolute Lymphocytes   3.0 


 


Absolute Monocytes   0.5 


 


Absolute Eosinophils   0.1 


 


Absolute Basophils   0.0 


 


Sodium    138.3


 


Potassium    3.8


 


Chloride    104


 


Carbon Dioxide    26


 


Anion Gap    8


 


BUN    15


 


Creatinine    0.96


 


Est GFR ( Amer)    > 60


 


Est GFR (Non-Af Amer)    > 60


 


Glucose    107


 


Calcium    8.6


 


Magnesium    2.2


 


Total Bilirubin    0.4


 


AST    21


 


ALT    26


 


Alkaline Phosphatase    114


 


Total Protein    6.2 L


 


Albumin    3.4 L


 


Urine Color  YELLOW  


 


Urine Appearance  SLIGHTLY-CLOUDY  


 


Urine pH  5.0  


 


Ur Specific Gravity  1.018  


 


Urine Protein  NEGATIVE  


 


Urine Glucose (UA)  NEGATIVE  


 


Urine Ketones  NEGATIVE  


 


Urine Blood  SMALL H  


 


Urine Nitrite  NEGATIVE  


 


Ur Leukocyte Esterase  MODERATE H  


 


Urine WBC (Auto)  20  


 


Urine RBC (Auto)  2  








                                        











  02/20/19 02/20/19 02/20/19





  15:00 15:00 18:03


 


Creatine Kinase  97  


 


CK-MB (CK-2)   0.36 


 


Troponin I   < 0.012  < 0.012














  02/20/19 02/20/19 02/21/19





  21:00 21:00 03:03


 


Creatine Kinase  82   78


 


CK-MB (CK-2)   0.29 


 


Troponin I   < 0.012 














  02/21/19 02/21/19 02/21/19





  03:03 09:23 09:23


 


Creatine Kinase   77 


 


CK-MB (CK-2)  0.29   0.26


 


Troponin I  < 0.012   < 0.012














  02/21/19 02/21/19 02/21/19





  15:24 15:24 22:03


 


Creatine Kinase  72   74


 


CK-MB (CK-2)   0.28 


 


Troponin I   < 0.012 














  02/21/19 02/22/19 02/22/19





  22:03 03:25 03:25


 


Creatine Kinase   70 


 


CK-MB (CK-2)  0.30   0.30


 


Troponin I  < 0.012   < 0.012











Impressions: 


                                        





Chest X-Ray  02/20/19 13:43


IMPRESSION:  1.  Linear subsegmental atelectasis or scar in the left mid-lower 

lung zones.  No acute pulmonary consolidation.


 








Head CT  02/20/19 13:46


IMPRESSION:  No acute abnormality in brain.


EVIDENCE OF ACUTE STROKE: NO.


 








Esophagus X-Ray  02/21/19 00:00


IMPRESSION:  Small hiatal hernia.  Minimal gastroesophageal reflux.  Tertiary 

contractions of the esophagus.


 














Qualifiers





- *


PATIENT BEING DISCHARGED WITH ANY OF THE FOLLOWING DIAGNOSIS: No


VTE patient discharged on overlapping Therapy?: No

## 2019-09-13 ENCOUNTER — HOSPITAL ENCOUNTER (OUTPATIENT)
Dept: HOSPITAL 62 - RAD | Age: 54
End: 2019-09-13
Attending: INTERNAL MEDICINE
Payer: OTHER GOVERNMENT

## 2019-09-13 DIAGNOSIS — R10.13: ICD-10-CM

## 2019-09-13 DIAGNOSIS — K82.8: Primary | ICD-10-CM

## 2019-09-13 PROCEDURE — A9537 TC99M MEBROFENIN: HCPCS

## 2019-09-13 PROCEDURE — 78227 HEPATOBIL SYST IMAGE W/DRUG: CPT

## 2019-09-13 NOTE — RADIOLOGY REPORT (SQ)
EXAM DESCRIPTION:  NM HIDA SCAN WITH CCK



COMPLETED DATE/TIME:  9/13/2019 3:09 pm



REASON FOR STUDY:  R10.13 EPIGASTRIC PAIN R10.13  EPIGASTRIC PAIN



COMPARISON:  None.



RADIONUCLIDE AND DOSE:  DOSAGE RADIONUCLIDE: 5.4 millicuries Tc99m Mebrofenin.

DOSAGE CCK: 1.9 micrograms.

DOSAGE MORPHINE: None.

The route of agent administration: Intravenous



TECHNIQUE:  A limited ultrasound of the right upper quadrant was performed prior to the administratio
n of the radiotracer to exclude cholelithiasis.  The sonographic images demonstrated that the gallbla
dder wall is normal in thickness and it measures up to 2.1 mm.  There is no cholelithiasis, sludge or
 pericholecystic fluid.

Serial images of the right upper quadrant were acquired up to 60 minutes following injection of the r
adionuclide. CCK injected after the gallbladder was visualized.



LIMITATIONS:  None.



FINDINGS:  LIVER: There is prompt uptake and excretion of the radiotracer by the hepatic parenchyma.

INTRAHEPATIC BILE DUCTS: Normal.

COMMON BILE DUCT: Normal without dilatation.

GALLBLADDER:  There is activity within the gallbladder 20 minutes post injection.  The calculated eje
ction fraction is 15% (normal range is greater than 35%).

PHYSICAL RESPONSE:  The patient complain that nausea after the injection of CCK; no pain was reproduc
ed.

OTHER: No other finding.



IMPRESSION:  Low ejection fracture of 15%.  These findings are consistent with biliary dyskinesis.  T
here is no common bile duct obstruction.



TECHNICAL DOCUMENTATION:  JOB ID:  [id number]

 2011 [employer]- All Rights Reserved



Reading location - IP/workstation name: [email or web address]